# Patient Record
Sex: FEMALE | Race: WHITE | NOT HISPANIC OR LATINO
[De-identification: names, ages, dates, MRNs, and addresses within clinical notes are randomized per-mention and may not be internally consistent; named-entity substitution may affect disease eponyms.]

---

## 2020-11-25 ENCOUNTER — APPOINTMENT (OUTPATIENT)
Dept: ORTHOPEDIC SURGERY | Facility: CLINIC | Age: 20
End: 2020-11-25
Payer: COMMERCIAL

## 2020-11-25 VITALS
HEIGHT: 64 IN | DIASTOLIC BLOOD PRESSURE: 71 MMHG | WEIGHT: 150 LBS | HEART RATE: 68 BPM | BODY MASS INDEX: 25.61 KG/M2 | SYSTOLIC BLOOD PRESSURE: 106 MMHG

## 2020-11-25 DIAGNOSIS — Z78.9 OTHER SPECIFIED HEALTH STATUS: ICD-10-CM

## 2020-11-25 DIAGNOSIS — Z72.89 OTHER PROBLEMS RELATED TO LIFESTYLE: ICD-10-CM

## 2020-11-25 PROCEDURE — 99072 ADDL SUPL MATRL&STAF TM PHE: CPT

## 2020-11-25 PROCEDURE — 27816 TREATMENT OF ANKLE FRACTURE: CPT | Mod: RT

## 2020-11-25 PROCEDURE — 99203 OFFICE O/P NEW LOW 30 MIN: CPT | Mod: 57

## 2020-11-25 RX ORDER — CHROMIUM 200 MCG
TABLET ORAL
Refills: 0 | Status: ACTIVE | COMMUNITY

## 2020-11-25 RX ORDER — B-COMPLEX WITH VITAMIN C
TABLET ORAL
Refills: 0 | Status: ACTIVE | COMMUNITY

## 2020-12-02 NOTE — ADDENDUM
[FreeTextEntry1] : This note was written by Chetan Fox on 12/02/2020, acting as a scribe for Tyler Ricks III, MD

## 2020-12-02 NOTE — PHYSICAL EXAM
[de-identified] : Left Ankle: Range of Motion in Degrees:\par 	                                Claimant:	                Normal:	\par Dorsiflexion (Active)	40-degrees	40-degrees	\par Dorsiflexion (Passive)	40-degrees	40-degrees	\par Plantar (Active)	                40-degrees	40-degrees	\par Plantar (Passive)	                40-degrees	40-degrees	\par Inversion (Active)	                30-degrees	30-degrees	\par Inversion (Passive)	                30-degrees	30-degrees	\par Eversion  (Active)	                20-degrees	20-degrees	\par Eversion (Passive) 	                20-degrees	20-degrees	\par \par No weakness in dorsiflexion, plantar flexion, inversion or eversion.  Normal sensation.  No tenderness over the medial or lateral ligaments.  No tenderness over the DLES.  No evidence of instability.  Negative anterior drawer sign.  No medial or lateral bony tenderness.  No proximal fibular tenderness.  No anterior, posterior, medial or lateral tendon tenderness.  No intra-articular swelling.  No extra-articular swelling, edema or tenderness.  No tenderness over the plantar aspect of the os calcis.  2+ DP and PT pulses. Skin is intact.  No rashes, scars or lesions.  \par  \par Right Ankle:  Limited range of motion secondary to boot immobilization.   No gross neurologic or vascular deficits distally. \par  [de-identified] : Ambulating in a boot.  [de-identified] : Appearance:  Well developed, well-nourished female in no acute distress.\par   [de-identified] : Review of radiographs of the right ankle show a minimally to nondisplaced fracture of lateral malleolus (between Laird B and Laird C).  The mortise is maintained.

## 2020-12-02 NOTE — HISTORY OF PRESENT ILLNESS
[3] : a current pain level of 3/10 [7] : the relief from medicine is 7/10 [5] : the ailment interference is 5/10 [6] : the ailment interference is 6/10 [9] : the ailment interference is 9/10 [10  (interferes completely)] : the ailment interference is10/10 (interferes completely) [8] : the ailment interference is 8/10 [de-identified] : The patient comes in today with complaints to her right ankle.  She is status post an injury and she is almost 3 weeks out.  She comes in for evaluation.  The patient states the onset/injury occurred on 11/07/2020.  This injury is not work related or due to an automobile accident.  The patient states the pain is dull/achy, but sharp/stabbing with any sudden movements.  The patient describes the pain as localized. [de-identified] : Keeping foot down for too long without elevation [de-identified] : Rest, ice, ibuprofen, Tylenol and elevation [] : No [de-identified] : Boot on right leg/foot [de-identified] : Ibuprofen/Tylenol [de-identified] : "I fell because my crutches slipped out from under me on a slope in my kitchen (this happened last Friday, 11/20/2020."

## 2020-12-02 NOTE — DISCUSSION/SUMMARY
[de-identified] : At this time, because she is 3 weeks out status post fracture of right lateral malleolus, I recommended to continue nonoperative management, maintain nonweightbearing and she will be reassessed in 3 weeks.\par \par I declare responsibility and liability for caring for this fracture for the next 90 days. \par

## 2020-12-16 ENCOUNTER — APPOINTMENT (OUTPATIENT)
Dept: ORTHOPEDIC SURGERY | Facility: CLINIC | Age: 20
End: 2020-12-16

## 2021-01-26 ENCOUNTER — APPOINTMENT (OUTPATIENT)
Dept: OBGYN | Facility: CLINIC | Age: 21
End: 2021-01-26

## 2021-02-25 ENCOUNTER — APPOINTMENT (OUTPATIENT)
Dept: OBGYN | Facility: CLINIC | Age: 21
End: 2021-02-25

## 2021-03-18 ENCOUNTER — APPOINTMENT (OUTPATIENT)
Dept: ORTHOPEDIC SURGERY | Facility: CLINIC | Age: 21
End: 2021-03-18
Payer: COMMERCIAL

## 2021-03-18 PROCEDURE — 99441: CPT

## 2021-03-31 ENCOUNTER — APPOINTMENT (OUTPATIENT)
Dept: ORTHOPEDIC SURGERY | Facility: CLINIC | Age: 21
End: 2021-03-31
Payer: COMMERCIAL

## 2021-03-31 PROCEDURE — 99441: CPT

## 2021-04-05 ENCOUNTER — APPOINTMENT (OUTPATIENT)
Dept: CT IMAGING | Facility: CLINIC | Age: 21
End: 2021-04-05
Payer: SELF-PAY

## 2021-04-05 ENCOUNTER — OUTPATIENT (OUTPATIENT)
Dept: OUTPATIENT SERVICES | Facility: HOSPITAL | Age: 21
LOS: 1 days | End: 2021-04-05
Payer: COMMERCIAL

## 2021-04-05 ENCOUNTER — RESULT REVIEW (OUTPATIENT)
Age: 21
End: 2021-04-05

## 2021-04-05 ENCOUNTER — APPOINTMENT (OUTPATIENT)
Dept: ORTHOPEDIC SURGERY | Facility: CLINIC | Age: 21
End: 2021-04-05
Payer: COMMERCIAL

## 2021-04-05 DIAGNOSIS — S82.401K: ICD-10-CM

## 2021-04-05 PROCEDURE — 99214 OFFICE O/P EST MOD 30 MIN: CPT

## 2021-04-05 PROCEDURE — 73700 CT LOWER EXTREMITY W/O DYE: CPT | Mod: 26,RT

## 2021-04-05 PROCEDURE — 99072 ADDL SUPL MATRL&STAF TM PHE: CPT

## 2021-04-05 PROCEDURE — 76376 3D RENDER W/INTRP POSTPROCES: CPT

## 2021-04-05 PROCEDURE — 73700 CT LOWER EXTREMITY W/O DYE: CPT

## 2021-04-05 PROCEDURE — 76376 3D RENDER W/INTRP POSTPROCES: CPT | Mod: 26

## 2021-04-05 NOTE — ADDENDUM
[FreeTextEntry1] : I, Robert Moreno, acted solely as a scribe for Dr. Abraham Cooney on this date 04/05/2021 .\par All medical record entries made by the Scribe were at my, Dr. Abraham Cooney, direction and personally dictated by me on 04/05/2021 . I have reviewed the chart and agree that the record accurately reflects my personal performance of the history, physical exam, assessment and plan. I have also personally directed, reviewed, and agreed with the chart.

## 2021-04-05 NOTE — HISTORY OF PRESENT ILLNESS
[FreeTextEntry1] : Dora is a 21 yo female who presents in office for an evaluation of her right ankle. In November 7, 2020 she was playing club soccer and fractured her right ankle, inversion injury.  She presents for a third opinion in regards to her ankle.  She treated the fracture non-op.  The patient was in a boot for approx. 6 weeks, non weight bearing,  and then transferred into a brace.  The patient patient was in PT for for 3 weeks, went back to [employer] [facility] [employer] [facility] where she plays soccer and continued with a Home Exercise Program.  The patient is here because she continues to have pain, especially when playing.  The pain is located over the lateral aspect of ankle where fibula fracture is.  She has increased pain after being active, and describes the pain as an "internal bruise".  Patient has been utilizing a bone stimulator for 2 months, and continues to ice the ankle. Patient has an MRI of her ankle from 3/27/21 showing a possible nonunion fracture of the fibula.  Patient presents with her father in office.

## 2021-04-05 NOTE — CONSULT LETTER
[Consult Letter:] : I had the pleasure of evaluating your patient, [unfilled]. [Please see my note below.] : Please see my note below. [Consult Closing:] : Thank you very much for allowing me to participate in the care of this patient.  If you have any questions, please do not hesitate to contact me. [Sincerely,] : Sincerely, [FreeTextEntry3] : Abraham Cooney, DO\par Foot and Ankle Surgery\par

## 2021-04-05 NOTE — PHYSICAL EXAM
[de-identified] : General: Alert and oriented x3. In no acute distress. Pleasant in nature with a normal affect. No apparent respiratory distress.\par \par R Ankle Exam\par Skin: Clean, dry, intact\par Inspection: No obvious malalignment, no swelling, no effusion; no lymphadenopathy\par Pulses: 2+ DP/PT pulses\par ROM: R Ankle 10 degrees of dorsiflexion, 40 degrees of plantarflexion, 10 degrees of subtalar motion\par Tenderness: No pain on fibula. No tenderness over the lateral malleolus, no CFL/ATFL/PTFL pain. No medial malleolus pain, no deltoid ligament pain. No proximal fibular pain. No heel pain.\par Stability: Negative anterior/posterior drawer. Peroneals are stable. \par Strength: 5/5 TA/GS/EHL\par Neuro: In tact to light touch throughout\par Additional tests: Negative Mortons test, Negative syndesmosis squeeze test.  [de-identified] : An MRI was obtained of the right ankle from [facility] on 3/27/2021 and reviewed by me today 04/05/2021  in the office. Revealed: Oblique nondisplaced fracture of the lateral malleolus. The fracture margins demonstrate low signal suggesting sclerosis. No definitive evidence of osseous bridging. Findings may represent nonunion. Radiographic or CT correlation recommended to assess this finding further. \par Small contusion in the medial malleolus.\par Chronic syndesmotic sprain.\par Chronic sprain/scarring of the talofibular ligaments. No rupture. \par Small tibiotalar and posterior subtalar joint effusions with lateral soft tissue swelling present. \par \par X-rays of the right ankle reviewed, 3/11/2021: Right fibula fracture, displacement noted.

## 2021-04-05 NOTE — DISCUSSION/SUMMARY
[de-identified] : Today I had a lengthy discussion with the patient regarding their right ankle pain. I have addressed all the patient's concerns surrounding the pathology of their condition. The previous MRI report was reviewed with the patient today. At this time I would like to obtain advanced imaging of the patient's right ankle. A CT scan was ordered so I can find out more about the etiology of the patient's condition. The patient should follow up with the office after obtaining the CT scan. Patient can follow up via telephone to review the results. The patient understood and verbally agreed to the treatment plan. All of their questions were answered and they were satisfied with the visit. The patient should call the office if they have any questions or experience worsening symptoms.

## 2021-04-07 ENCOUNTER — APPOINTMENT (OUTPATIENT)
Dept: ORTHOPEDIC SURGERY | Facility: CLINIC | Age: 21
End: 2021-04-07
Payer: COMMERCIAL

## 2021-04-07 PROCEDURE — 99442: CPT

## 2021-05-17 ENCOUNTER — APPOINTMENT (OUTPATIENT)
Dept: ORTHOPEDIC SURGERY | Facility: CLINIC | Age: 21
End: 2021-05-17

## 2021-05-26 ENCOUNTER — APPOINTMENT (OUTPATIENT)
Dept: ORTHOPEDIC SURGERY | Facility: CLINIC | Age: 21
End: 2021-05-26
Payer: COMMERCIAL

## 2021-05-26 ENCOUNTER — NON-APPOINTMENT (OUTPATIENT)
Age: 21
End: 2021-05-26

## 2021-05-26 DIAGNOSIS — S82.401K: ICD-10-CM

## 2021-05-26 DIAGNOSIS — S82.64XA NONDISPLACED FRACTURE OF LATERAL MALLEOLUS OF RIGHT FIBULA, INITIAL ENCOUNTER FOR CLOSED FRACTURE: ICD-10-CM

## 2021-05-26 DIAGNOSIS — S82.831K OTHER FRACTURE OF UPPER AND LOWER END OF RIGHT FIBULA, SUBSEQUENT ENCOUNTER FOR CLOSED FRACTURE WITH NONUNION: ICD-10-CM

## 2021-05-26 PROCEDURE — 99213 OFFICE O/P EST LOW 20 MIN: CPT

## 2021-05-26 PROCEDURE — 73610 X-RAY EXAM OF ANKLE: CPT | Mod: RT

## 2021-05-26 PROCEDURE — 99072 ADDL SUPL MATRL&STAF TM PHE: CPT

## 2021-05-26 NOTE — ADDENDUM
[FreeTextEntry1] : I, Eleazar Gibbs, acted solely as a scribe for Dr. Abraham Cooney on this date 05/26/2021  .\par  \par All medical record entries made by the Scribe were at my, Dr. Abraham Cooney, direction and personally dictated by me on 05/26/2021 . I have reviewed the chart and agree that the record accurately reflects my personal performance of the history, physical exam, assessment and plan. I have also personally directed, reviewed, and agreed with the chart.

## 2021-05-26 NOTE — PHYSICAL EXAM
[de-identified] : General: Alert and oriented x3. In no acute distress. Pleasant in nature with a normal affect. No apparent respiratory distress.\par \par R Ankle Exam\par Skin: Clean, dry, intact\par Inspection: No obvious malalignment, no swelling, no effusion; no lymphadenopathy\par Pulses: 2+ DP/PT pulses\par ROM: R Ankle 10 degrees of dorsiflexion, 40 degrees of plantarflexion, 10 degrees of subtalar motion\par Tenderness: + peroneal tubercle. No pain on fibula. No tenderness over the lateral malleolus, no CFL/ATFL/PTFL pain. No medial malleolus pain, no deltoid ligament pain. No proximal fibular pain. No heel pain.\par Stability: Negative anterior/posterior drawer. Peroneals are stable. \par Strength: 5/5 TA/GS/EHL\par Neuro: In tact to light touch throughout\par Additional tests: Negative Mortons test, Negative syndesmosis squeeze test.  [de-identified] : CT of right ankle obtained from outside facility on 4/5/2021 and reviewed in the office today, 05/26/2021, and revealed: \par \par Impression:\par Early osseous healing of a distal fibular fracture as detailed above. 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

## 2021-05-26 NOTE — HISTORY OF PRESENT ILLNESS
[FreeTextEntry1] : 5/26/2021: DORA MICHELE is a 20 year old female who presents for follow-up evaluation of right ankle pain. She notes the pain has improved where the fracture was. Patient has stopped utilizing the bone stimulator. \par \par 4/5/2021: Dora is a 21 yo female who presents in office for an evaluation of her right ankle. In November 7, 2020 she was playing club soccer and fractured her right ankle, inversion injury.  She presents for a third opinion in regards to her ankle.  She treated the fracture non-op.  The patient was in a boot for approx. 6 weeks, non weight bearing,  and then transferred into a brace.  The patient patient was in PT for for 3 weeks, went back to school [employer] where she plays soccer and continued with a Home Exercise Program.  The patient is here because she continues to have pain, especially when playing.  The pain is located over the lateral aspect of ankle where fibula fracture is.  She has increased pain after being active, and describes the pain as an "internal bruise".  Patient has been utilizing a bone stimulator for 2 months, and continues to ice the ankle. Patient has an MRI of her ankle from 3/27/21 showing a possible nonunion fracture of the fibula.  Patient presents with her father in office.

## 2021-05-26 NOTE — DISCUSSION/SUMMARY
[de-identified] : Today I had a lengthy discussion with the patient regarding their right ankle pain. I have addressed all the patient's concerns surrounding the pathology of their condition. XR films were reviewed with the patient. Clinically, the patient has no pain. I do not believe any surgical intervention is warranted.\par \par I recommend the patient continue utilizing the bone stimulator. I recommend that the patient utilize ice, heat, NSAIDs prn. They can also elevate their right ankle above the level of the heart. I recommend the patient undergo a course of physical therapy for the right ankle  2-3 times a week for a total of 6-8 weeks. A prescription was given for the physical therapy today. \par \par I would like to see the patient back in the office prn to reassess their condition. The patient understood and verbally agreed to the treatment plan. All of their questions were answered and they were satisfied with the visit. The patient should call the office if they have any questions or experience worsening symptoms.

## 2021-06-17 ENCOUNTER — APPOINTMENT (OUTPATIENT)
Dept: OBGYN | Facility: CLINIC | Age: 21
End: 2021-06-17
Payer: COMMERCIAL

## 2021-06-17 VITALS
DIASTOLIC BLOOD PRESSURE: 72 MMHG | WEIGHT: 142 LBS | BODY MASS INDEX: 24.24 KG/M2 | HEIGHT: 64 IN | SYSTOLIC BLOOD PRESSURE: 120 MMHG

## 2021-06-17 DIAGNOSIS — Z87.2 PERSONAL HISTORY OF DISEASES OF THE SKIN AND SUBCUTANEOUS TISSUE: ICD-10-CM

## 2021-06-17 DIAGNOSIS — Z80.3 FAMILY HISTORY OF MALIGNANT NEOPLASM OF BREAST: ICD-10-CM

## 2021-06-17 PROCEDURE — 99385 PREV VISIT NEW AGE 18-39: CPT

## 2021-06-17 PROCEDURE — 99072 ADDL SUPL MATRL&STAF TM PHE: CPT

## 2021-09-21 ENCOUNTER — APPOINTMENT (OUTPATIENT)
Dept: OBGYN | Facility: CLINIC | Age: 21
End: 2021-09-21
Payer: COMMERCIAL

## 2021-09-21 PROCEDURE — 99214 OFFICE O/P EST MOD 30 MIN: CPT | Mod: 95

## 2021-09-21 NOTE — HISTORY OF PRESENT ILLNESS
[FreeTextEntry1] : 21 yo G0 with LMP 6/7/21\par \par Menstrual triad: 12 x 28 (had been Q3-5 weeks) x 5\par \par ?Gardasil vaccination. Does not think she had it.\par Not sexually active. Once in April. took Plan B and bled for 2 wks. \par Menses self regulated. \par Prior skipped menses in her 20s. She thought because she played soccer during college.\par No need for birth control now.\par Doesn't take NSAIDs with menses.\par \par FH:\par P Aunt breast cancer age 40\par \par SH:\par broken fibula - does not need surgery\par \par \par \par

## 2021-09-21 NOTE — REASON FOR VISIT
[Other Location: e.g. School (Enter Location, City,State)___] : at [unfilled], at the time of the visit. [Verbal consent obtained from patient] : the patient, [unfilled] [Follow-Up] : a follow-up evaluation of

## 2021-09-21 NOTE — DISCUSSION/SUMMARY
[FreeTextEntry1] : First Pap today\par STD risks and preventions\par Offered Gardasil, She will check with her mother\par Reviewed menstrual cycle.\par \par Menses irregular in past.\par -no prior thyroid dx personally nor family\par -if menses skip again, would check hormones\par -if she requires birth control, discussed options and would favor OCP\par \par RTO 1 yr or sooner for bc

## 2021-09-21 NOTE — DISCUSSION/SUMMARY
[FreeTextEntry1] : Risks, benefits and alternatives to OCP discussed with patient including breast cancer, venous thromboembolism and benefits of reduced ovarian cancer.  Alternatives discussed including Progesterone IUDs, injections, condoms, fertility awareness and the reported/empirical success rates of each.\par Menstrual cycle reviewed in detail again today. Question answered regarding r/b/a to each method.\par \par Will  levonorgestrel/EE 20mcg if desired.\par Technique of how to start, linking with toothbrushing, limitations of when it does not work detailed.\par \par RTO 6 months after starting (telemed ok prior to returning home)  Will still need 6/22 annual\par

## 2021-09-21 NOTE — HISTORY OF PRESENT ILLNESS
[FreeTextEntry1] : 21 y G0 with regular menses desires contraception.\par First seen 6/17/21 (see note)\par Steady BF.  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

## 2021-09-24 ENCOUNTER — APPOINTMENT (OUTPATIENT)
Dept: OBGYN | Facility: CLINIC | Age: 21
End: 2021-09-24

## 2022-06-22 ENCOUNTER — APPOINTMENT (OUTPATIENT)
Dept: OBGYN | Facility: CLINIC | Age: 22
End: 2022-06-22

## 2022-07-05 ENCOUNTER — APPOINTMENT (OUTPATIENT)
Dept: OBGYN | Facility: CLINIC | Age: 22
End: 2022-07-05

## 2022-07-05 VITALS
DIASTOLIC BLOOD PRESSURE: 60 MMHG | WEIGHT: 145.38 LBS | RESPIRATION RATE: 18 BRPM | TEMPERATURE: 98.4 F | BODY MASS INDEX: 24.82 KG/M2 | SYSTOLIC BLOOD PRESSURE: 110 MMHG | HEIGHT: 64 IN | HEART RATE: 72 BPM

## 2022-07-05 DIAGNOSIS — Z12.4 ENCOUNTER FOR SCREENING FOR MALIGNANT NEOPLASM OF CERVIX: ICD-10-CM

## 2022-07-05 PROCEDURE — 99395 PREV VISIT EST AGE 18-39: CPT

## 2022-07-05 NOTE — HISTORY OF PRESENT ILLNESS
[FreeTextEntry1] : 23 yo G0 with LMP 6/17 for annual\par \par Never started lo estrin 1/20\par Using condoms and periodic abstinence.\par Using temperature to zackery ovulation, but unsure if she is predicting regularly.\par \par currently 24-28 days.\par Predicted ovulation day 15-19

## 2022-07-05 NOTE — DISCUSSION/SUMMARY
[FreeTextEntry1] : Health Maintenance:\par pap today \par discussed ovulation, menstrual cycle and various options\par \par Will continue with condoms and periodic abstinence.\par \par rto 12 m0

## 2022-07-06 LAB
C TRACH RRNA SPEC QL NAA+PROBE: NOT DETECTED
N GONORRHOEA RRNA SPEC QL NAA+PROBE: NOT DETECTED
SOURCE TP AMPLIFICATION: NORMAL

## 2022-07-11 LAB — CYTOLOGY CVX/VAG DOC THIN PREP: NORMAL

## 2023-07-11 ENCOUNTER — APPOINTMENT (OUTPATIENT)
Dept: OBGYN | Facility: CLINIC | Age: 23
End: 2023-07-11
Payer: COMMERCIAL

## 2023-07-11 VITALS
HEIGHT: 64 IN | SYSTOLIC BLOOD PRESSURE: 112 MMHG | BODY MASS INDEX: 24.24 KG/M2 | DIASTOLIC BLOOD PRESSURE: 66 MMHG | WEIGHT: 142 LBS

## 2023-07-11 DIAGNOSIS — Z30.9 ENCOUNTER FOR CONTRACEPTIVE MANAGEMENT, UNSPECIFIED: ICD-10-CM

## 2023-07-11 PROCEDURE — 99395 PREV VISIT EST AGE 18-39: CPT

## 2023-07-11 RX ORDER — LEVONORGESTREL AND ETHINYL ESTRADIOL 0.1-0.02MG
0.1-2 KIT ORAL DAILY
Qty: 3 | Refills: 0 | Status: COMPLETED | COMMUNITY
Start: 2021-09-21 | End: 2023-07-11

## 2023-07-13 PROBLEM — Z30.9 CONTRACEPTION MANAGEMENT: Status: ACTIVE | Noted: 2021-09-21

## 2023-07-13 NOTE — HISTORY OF PRESENT ILLNESS
[FreeTextEntry1] : 24 yo G0 with LMP 6/17 for annual\par \par Never started lo estrin 1/20\par Using condoms and periodic abstinence.\par Using temperature to zackery ovulation, but unsure if she is predicting regularly.\par \par currently 29-35  days.\par Predicted ovulation day 15-19\par \par SH:\par fulltime teaching, [employer] for 2 yrs

## 2023-07-13 NOTE — DISCUSSION/SUMMARY
[FreeTextEntry1] : Stable relationship\par reviewed cycles, ovulation and periodic abstinence\par \par Health Maintenance:\par Pap today\par STD screen\par \par RTO 1 yr

## 2023-07-19 LAB
C TRACH RRNA SPEC QL NAA+PROBE: NOT DETECTED
CYTOLOGY CVX/VAG DOC THIN PREP: NORMAL
N GONORRHOEA RRNA SPEC QL NAA+PROBE: NOT DETECTED
SOURCE AMPLIFICATION: NORMAL

## 2024-03-28 ENCOUNTER — EMERGENCY (EMERGENCY)
Facility: HOSPITAL | Age: 24
LOS: 0 days | Discharge: HOME | End: 2024-03-28
Attending: EMERGENCY MEDICINE
Payer: COMMERCIAL

## 2024-03-28 VITALS
RESPIRATION RATE: 18 BRPM | OXYGEN SATURATION: 98 % | HEART RATE: 65 BPM | DIASTOLIC BLOOD PRESSURE: 70 MMHG | SYSTOLIC BLOOD PRESSURE: 108 MMHG

## 2024-03-28 VITALS — HEIGHT: 64 IN | WEIGHT: 139.99 LBS

## 2024-03-28 DIAGNOSIS — R10.31 RIGHT LOWER QUADRANT PAIN: ICD-10-CM

## 2024-03-28 DIAGNOSIS — N83.201 UNSPECIFIED OVARIAN CYST, RIGHT SIDE: ICD-10-CM

## 2024-03-28 DIAGNOSIS — R10.2 PELVIC AND PERINEAL PAIN: ICD-10-CM

## 2024-03-28 LAB
ALBUMIN SERPL ELPH-MCNC: 3.5 G/DL — SIGNIFICANT CHANGE UP (ref 3.3–5)
ALP SERPL-CCNC: 33 U/L — LOW (ref 40–120)
ALT FLD-CCNC: 22 U/L — SIGNIFICANT CHANGE UP (ref 12–78)
ANION GAP SERPL CALC-SCNC: 5 MMOL/L — SIGNIFICANT CHANGE UP (ref 5–17)
APPEARANCE UR: CLEAR — SIGNIFICANT CHANGE UP
AST SERPL-CCNC: 16 U/L — SIGNIFICANT CHANGE UP (ref 15–37)
BASOPHILS # BLD AUTO: 0.04 K/UL — SIGNIFICANT CHANGE UP (ref 0–0.2)
BASOPHILS NFR BLD AUTO: 0.7 % — SIGNIFICANT CHANGE UP (ref 0–2)
BILIRUB SERPL-MCNC: 0.4 MG/DL — SIGNIFICANT CHANGE UP (ref 0.2–1.2)
BILIRUB UR-MCNC: NEGATIVE — SIGNIFICANT CHANGE UP
BUN SERPL-MCNC: 11 MG/DL — SIGNIFICANT CHANGE UP (ref 7–23)
CALCIUM SERPL-MCNC: 8.6 MG/DL — SIGNIFICANT CHANGE UP (ref 8.5–10.1)
CHLORIDE SERPL-SCNC: 111 MMOL/L — HIGH (ref 96–108)
CO2 SERPL-SCNC: 24 MMOL/L — SIGNIFICANT CHANGE UP (ref 22–31)
COLOR SPEC: YELLOW — SIGNIFICANT CHANGE UP
CREAT SERPL-MCNC: 0.79 MG/DL — SIGNIFICANT CHANGE UP (ref 0.5–1.3)
DIFF PNL FLD: NEGATIVE — SIGNIFICANT CHANGE UP
EGFR: 107 ML/MIN/1.73M2 — SIGNIFICANT CHANGE UP
EOSINOPHIL # BLD AUTO: 0.05 K/UL — SIGNIFICANT CHANGE UP (ref 0–0.5)
EOSINOPHIL NFR BLD AUTO: 0.8 % — SIGNIFICANT CHANGE UP (ref 0–6)
GLUCOSE SERPL-MCNC: 86 MG/DL — SIGNIFICANT CHANGE UP (ref 70–99)
GLUCOSE UR QL: NEGATIVE MG/DL — SIGNIFICANT CHANGE UP
HCT VFR BLD CALC: 40 % — SIGNIFICANT CHANGE UP (ref 34.5–45)
HGB BLD-MCNC: 13.2 G/DL — SIGNIFICANT CHANGE UP (ref 11.5–15.5)
IMM GRANULOCYTES NFR BLD AUTO: 0.2 % — SIGNIFICANT CHANGE UP (ref 0–0.9)
KETONES UR-MCNC: NEGATIVE MG/DL — SIGNIFICANT CHANGE UP
LEUKOCYTE ESTERASE UR-ACNC: NEGATIVE — SIGNIFICANT CHANGE UP
LYMPHOCYTES # BLD AUTO: 2.07 K/UL — SIGNIFICANT CHANGE UP (ref 1–3.3)
LYMPHOCYTES # BLD AUTO: 34.6 % — SIGNIFICANT CHANGE UP (ref 13–44)
MCHC RBC-ENTMCNC: 30.3 PG — SIGNIFICANT CHANGE UP (ref 27–34)
MCHC RBC-ENTMCNC: 33 GM/DL — SIGNIFICANT CHANGE UP (ref 32–36)
MCV RBC AUTO: 92 FL — SIGNIFICANT CHANGE UP (ref 80–100)
MONOCYTES # BLD AUTO: 0.65 K/UL — SIGNIFICANT CHANGE UP (ref 0–0.9)
MONOCYTES NFR BLD AUTO: 10.9 % — SIGNIFICANT CHANGE UP (ref 2–14)
NEUTROPHILS # BLD AUTO: 3.17 K/UL — SIGNIFICANT CHANGE UP (ref 1.8–7.4)
NEUTROPHILS NFR BLD AUTO: 52.8 % — SIGNIFICANT CHANGE UP (ref 43–77)
NITRITE UR-MCNC: NEGATIVE — SIGNIFICANT CHANGE UP
PH UR: 7.5 — SIGNIFICANT CHANGE UP (ref 5–8)
PLATELET # BLD AUTO: 224 K/UL — SIGNIFICANT CHANGE UP (ref 150–400)
POCT URINE PREGNANCY TEST: NEGATIVE — SIGNIFICANT CHANGE UP
POTASSIUM SERPL-MCNC: 3.9 MMOL/L — SIGNIFICANT CHANGE UP (ref 3.5–5.3)
POTASSIUM SERPL-SCNC: 3.9 MMOL/L — SIGNIFICANT CHANGE UP (ref 3.5–5.3)
PROT SERPL-MCNC: 6.4 GM/DL — SIGNIFICANT CHANGE UP (ref 6–8.3)
PROT UR-MCNC: NEGATIVE MG/DL — SIGNIFICANT CHANGE UP
RBC # BLD: 4.35 M/UL — SIGNIFICANT CHANGE UP (ref 3.8–5.2)
RBC # FLD: 12 % — SIGNIFICANT CHANGE UP (ref 10.3–14.5)
SODIUM SERPL-SCNC: 140 MMOL/L — SIGNIFICANT CHANGE UP (ref 135–145)
SP GR SPEC: 1.02 — SIGNIFICANT CHANGE UP (ref 1–1.03)
UROBILINOGEN FLD QL: 0.2 MG/DL — SIGNIFICANT CHANGE UP (ref 0.2–1)
WBC # BLD: 5.99 K/UL — SIGNIFICANT CHANGE UP (ref 3.8–10.5)
WBC # FLD AUTO: 5.99 K/UL — SIGNIFICANT CHANGE UP (ref 3.8–10.5)

## 2024-03-28 PROCEDURE — 99285 EMERGENCY DEPT VISIT HI MDM: CPT | Mod: 25

## 2024-03-28 PROCEDURE — 76830 TRANSVAGINAL US NON-OB: CPT

## 2024-03-28 PROCEDURE — 81003 URINALYSIS AUTO W/O SCOPE: CPT

## 2024-03-28 PROCEDURE — 93975 VASCULAR STUDY: CPT

## 2024-03-28 PROCEDURE — 74177 CT ABD & PELVIS W/CONTRAST: CPT | Mod: 26,MC

## 2024-03-28 PROCEDURE — 76830 TRANSVAGINAL US NON-OB: CPT | Mod: 26

## 2024-03-28 PROCEDURE — 87086 URINE CULTURE/COLONY COUNT: CPT

## 2024-03-28 PROCEDURE — 99285 EMERGENCY DEPT VISIT HI MDM: CPT

## 2024-03-28 PROCEDURE — 96375 TX/PRO/DX INJ NEW DRUG ADDON: CPT

## 2024-03-28 PROCEDURE — 81025 URINE PREGNANCY TEST: CPT

## 2024-03-28 PROCEDURE — 87491 CHLMYD TRACH DNA AMP PROBE: CPT

## 2024-03-28 PROCEDURE — 36415 COLL VENOUS BLD VENIPUNCTURE: CPT

## 2024-03-28 PROCEDURE — 85025 COMPLETE CBC W/AUTO DIFF WBC: CPT

## 2024-03-28 PROCEDURE — 80053 COMPREHEN METABOLIC PANEL: CPT

## 2024-03-28 PROCEDURE — 87591 N.GONORRHOEAE DNA AMP PROB: CPT

## 2024-03-28 PROCEDURE — 93975 VASCULAR STUDY: CPT | Mod: 26

## 2024-03-28 PROCEDURE — 74177 CT ABD & PELVIS W/CONTRAST: CPT | Mod: MC

## 2024-03-28 PROCEDURE — 96374 THER/PROPH/DIAG INJ IV PUSH: CPT | Mod: XU

## 2024-03-28 RX ORDER — ACETAMINOPHEN 500 MG
1000 TABLET ORAL ONCE
Refills: 0 | Status: COMPLETED | OUTPATIENT
Start: 2024-03-28 | End: 2024-03-28

## 2024-03-28 RX ORDER — SODIUM CHLORIDE 9 MG/ML
1000 INJECTION INTRAMUSCULAR; INTRAVENOUS; SUBCUTANEOUS ONCE
Refills: 0 | Status: COMPLETED | OUTPATIENT
Start: 2024-03-28 | End: 2024-03-28

## 2024-03-28 RX ORDER — KETOROLAC TROMETHAMINE 30 MG/ML
15 SYRINGE (ML) INJECTION ONCE
Refills: 0 | Status: DISCONTINUED | OUTPATIENT
Start: 2024-03-28 | End: 2024-03-28

## 2024-03-28 RX ADMIN — Medication 15 MILLIGRAM(S): at 11:28

## 2024-03-28 RX ADMIN — SODIUM CHLORIDE 2000 MILLILITER(S): 9 INJECTION INTRAMUSCULAR; INTRAVENOUS; SUBCUTANEOUS at 09:16

## 2024-03-28 RX ADMIN — Medication 400 MILLIGRAM(S): at 09:16

## 2024-03-28 NOTE — ED PROVIDER NOTE - PHYSICAL EXAMINATION
CONSTITUTIONAL: Well appearing, awake, alert, oriented to person, place, time/situation and in no apparent distress.  · ENMT: Airway patent, Nasal mucosa clear. Mouth with normal mucosa. Throat has no vesicles, no oropharyngeal exudates and uvula is midline.  · EYES: Clear bilaterally, pupils equal, round and reactive to light.  · CARDIAC: Normal rate, regular rhythm.  Heart sounds S1, S2.  No murmurs, rubs or gallops.  · RESPIRATORY: Breath sounds clear and equal bilaterally.  · GASTROINTESTINAL: Abdomen soft,   Tenderness to palpation of the right lower quadrant, suprapubic, periumbilical region, left lower quadrant without guarding or rebound, negative Mon sign  · MUSCULOSKELETAL: Spine appears normal, range of motion is not limited, no muscle or joint tenderness  · NEUROLOGICAL: Alert and oriented, no focal deficits, no motor or sensory deficits.  · SKIN: Skin normal color for race, warm, dry and intact. No evidence of rash

## 2024-03-28 NOTE — ED ADULT TRIAGE NOTE - CHIEF COMPLAINT QUOTE
Pt presents to ED c/o RLQ abdominal pain, nauseous and fatigued that started on Saturday but worsened last night. Pt denies dark/bloody stools, hematuria, dysuria and fevers.

## 2024-03-28 NOTE — ED PROVIDER NOTE - PATIENT PORTAL LINK FT
You can access the FollowMyHealth Patient Portal offered by [facility] by registering at the following website: [email or web address]. By joining [email or web address]’s FollowMyHealth portal, you will also be able to view your health information using other applications (apps) compatible with our system.

## 2024-03-28 NOTE — ED PROVIDER NOTE - NSFOLLOWUPINSTRUCTIONS_ED_ALL_ED_FT
Follow-up with your OB/GYN physician within the next 1 to 3 days.  You can take ibuprofen 600 mg every 8 hours as needed for pain      Ovarian Cyst       An ovarian cyst is a fluid-filled sac that forms on an ovary. The ovaries are small organs that produce eggs in women. Various types of cysts can form on the ovaries. Some may cause symptoms and require treatment. Most ovarian cysts go away on their own, are not cancerous (are benign), and do not cause problems.    What are the causes?  Ovarian cysts may be caused by:    Ovarian hyperstimulation syndrome. This is a condition that can develop from taking fertility medicines. It causes multiple large ovarian cysts to form.  Polycystic ovarian syndrome (PCOS). This is a common hormonal disorder that can cause ovarian cysts to form, and can cause problems with your period or fertility.    What increases the risk?  The following factors may make you more likely to develop this condition:    Being overweight or obese.  Taking fertility medicines.  Taking certain forms of hormonal birth control.  Smoking.    What are the signs or symptoms?  Many ovarian cysts do not cause symptoms. If symptoms are present, they may include:    Pelvic pain or pressure.  Pain in the lower abdomen.  Pain during sex.  Abdominal swelling.  Abnormal menstrual periods.  Increasing pain with menstrual periods.    How is this diagnosed?  These cysts are commonly found during a routine pelvic exam. You may have tests to find out more about the cyst, such as:    Ultrasound.  X-ray of the pelvis.  CT scan.  MRI.  Blood tests.    How is this treated?  Many ovarian cysts go away on their own without treatment. Your health care provider may want to check your cyst regularly for 2–3 months to see if it changes. If you are in menopause, it is especially important to have your cyst monitored closely because menopausal women have a higher rate of ovarian cancer.    When treatment is needed, it may include:    Medicines to help relieve pain.  A procedure to drain the cyst (aspiration).  Surgery to remove the whole cyst.  Hormone treatment or birth control pills. These methods are sometimes used to help dissolve a cyst.    Follow these instructions at home:  Take over-the-counter and prescription medicines only as told by your health care provider.  Ask your health care provider if any medicine prescribed to you requires you to avoid driving or using machinery.  Get regular pelvic exams and Pap tests as often as told by your health care provider.  Return to your normal activities as told by your health care provider. Ask your health care provider what activities are safe for you.  Do not use any products that contain nicotine or tobacco, such as cigarettes, e-cigarettes, and chewing tobacco. If you need help quitting, ask your health care provider.  Keep all follow-up visits as told by your health care provider. This is important.    Contact a health care provider if:  Your periods are late, irregular, painful, or they stop.  You have pelvic pain that does not go away.  You have pressure on your bladder or trouble emptying your bladder completely.  You have any of the following:    A feeling of fullness.  You are gaining weight or losing weight without changing your exercise and eating habits.  Pain, swelling, or bloating in the abdomen.  Loss of appetite.  Pain and pressure in your back and pelvis.  You think you may be pregnant.    Get help right away if:  You have abdominal or pelvic pain that is severe or gets worse.  You cannot eat or drink without vomiting.  You suddenly develop a fever or chills.  Your menstrual period is much heavier than usual.    Summary  An ovarian cyst is a fluid-filled sac that forms on an ovary.  Some may cause symptoms and require treatment.  These cysts are commonly found during a routine pelvic exam.  Many ovarian cysts go away on their own without treatment.    ADDITIONAL NOTES AND INSTRUCTIONS    Please follow up with your Primary MD in 24-48 hr.  Seek immediate medical care for any new/worsening signs or symptoms.

## 2024-03-28 NOTE — ED PROVIDER NOTE - OBJECTIVE STATEMENT
23-year-old female no pertinent past medical or surgical history presents for evaluation of intermittent right lower quadrant and right pelvic pain that initially started approximately 1 week ago during sexual intercourse with her boyfriend.  Patient states that the pain initially started as a sudden onset of sharp discomfort that had resolved after sexual intercourse.  The patient states that her last menstrual period was approximately 2 weeks ago and she has not had any spotting.  Patient notes a clear vaginal discharge over the last week.  Patient states that her pain returned approximately 24 hours ago unprovoked.  Patient notes that there is some increased pain with palpation and movement.  Patient is afebrile.  Patient has not had any nausea or vomiting.  Patient notes that her urine had slight odor to it but denies any frequency or urgency.  Patient noted some increased bowel movements over the last 24 hours.

## 2024-03-28 NOTE — ED ADULT NURSE NOTE - OBJECTIVE STATEMENT
Pt presents to the ED c/o RLQ abdominal pain since saturday. Pt endorses abdominal pain since saturday with nausea, and vomiting. Pt denies fevers or chills. Pt also reports having sexual intercourse with her boyfriend 1 week ago. Reports clear veginal discharge.

## 2024-03-28 NOTE — ED PROVIDER NOTE - CLINICAL SUMMARY MEDICAL DECISION MAKING FREE TEXT BOX
23-year-old female no pertinent past medical or surgical history presents for evaluation of intermittent right lower quadrant and right pelvic pain that initially started approximately 1 week ago during sexual intercourse with her boyfriend.  Patient states that the pain initially started as a sudden onset of sharp discomfort that had resolved after sexual intercourse.  The patient states that her last menstrual period was approximately 2 weeks ago and she has not had any spotting.  Patient notes a clear vaginal discharge over the last week.  Patient states that her pain returned approximately 24 hours ago unprovoked.  Patient notes that there is some increased pain with palpation and movement.  Patient is afebrile.  Patient has not had any nausea or vomiting.  Patient notes that her urine had slight odor to it but denies any frequency or urgency.  Patient noted some increased bowel movements over the last 24 hours.   differential diagnosis listed above.  The story is not entirely consistent with acute appendicitis but will get CBC, CMP, urinalysis with culture, urine for GC and chlamydia, CT of the abdomen pelvis with IV contrast and transvaginal ultrasound to assist in ruling out ovarian torsion.

## 2024-03-29 PROBLEM — Z78.9 OTHER SPECIFIED HEALTH STATUS: Chronic | Status: ACTIVE | Noted: 2024-03-28

## 2024-03-29 LAB
C TRACH RRNA SPEC QL NAA+PROBE: SIGNIFICANT CHANGE UP
CULTURE RESULTS: SIGNIFICANT CHANGE UP
N GONORRHOEA RRNA SPEC QL NAA+PROBE: SIGNIFICANT CHANGE UP
SPECIMEN SOURCE: SIGNIFICANT CHANGE UP
SPECIMEN SOURCE: SIGNIFICANT CHANGE UP

## 2024-04-01 ENCOUNTER — APPOINTMENT (OUTPATIENT)
Dept: OBGYN | Facility: CLINIC | Age: 24
End: 2024-04-01
Payer: COMMERCIAL

## 2024-04-01 VITALS
BODY MASS INDEX: 24.07 KG/M2 | SYSTOLIC BLOOD PRESSURE: 108 MMHG | HEIGHT: 64 IN | WEIGHT: 141 LBS | DIASTOLIC BLOOD PRESSURE: 62 MMHG

## 2024-04-01 DIAGNOSIS — N83.201 UNSPECIFIED OVARIAN CYST, RIGHT SIDE: ICD-10-CM

## 2024-04-01 PROCEDURE — 99213 OFFICE O/P EST LOW 20 MIN: CPT

## 2024-04-02 ENCOUNTER — APPOINTMENT (OUTPATIENT)
Dept: OBGYN | Facility: CLINIC | Age: 24
End: 2024-04-02

## 2024-04-02 NOTE — DISCUSSION/SUMMARY
[FreeTextEntry1] : Cyst of right ovary -Had long discussion re: ovarian cysts and types of cysts and etiologies of cysts -Advised f/u in 8 weeks for re-evaluation -Advised NSAID with food

## 2024-04-02 NOTE — HISTORY OF PRESENT ILLNESS
[FreeTextEntry1] : 22yo G0 female with an LMP of 3/20/24 presents today for ED f/u for right ovarian CL cyst 1.6cm.   She reports pain in RLQ ~ 1 week prior to hospital visit. States she noticed pain with sex and the pain decreased in intensity. However she continued to noticed a dull tenderness in RLQ. She was advised by PCP to go to ED for eval to r/o appendicitis.   She was noted to have right ovarian CL cyst 1.6cm with trace of fluid. She reports pain has decreased since ED visit on 3/28 Incidental finding of ? arcuate uterus.  G/C cultures negative in ED  She is sexually active with one male partner, uses [facility].

## 2024-05-21 ENCOUNTER — OUTPATIENT (OUTPATIENT)
Dept: OUTPATIENT SERVICES | Facility: HOSPITAL | Age: 24
LOS: 1 days | End: 2024-05-21
Payer: COMMERCIAL

## 2024-05-21 ENCOUNTER — APPOINTMENT (OUTPATIENT)
Dept: ULTRASOUND IMAGING | Facility: CLINIC | Age: 24
End: 2024-05-21
Payer: COMMERCIAL

## 2024-05-21 ENCOUNTER — RESULT REVIEW (OUTPATIENT)
Age: 24
End: 2024-05-21

## 2024-05-21 DIAGNOSIS — N83.201 UNSPECIFIED OVARIAN CYST, RIGHT SIDE: ICD-10-CM

## 2024-05-21 PROCEDURE — 76830 TRANSVAGINAL US NON-OB: CPT | Mod: 26

## 2024-05-21 PROCEDURE — 76856 US EXAM PELVIC COMPLETE: CPT | Mod: 26

## 2024-05-21 PROCEDURE — 76830 TRANSVAGINAL US NON-OB: CPT

## 2024-05-21 PROCEDURE — 76856 US EXAM PELVIC COMPLETE: CPT

## 2024-11-07 ENCOUNTER — APPOINTMENT (OUTPATIENT)
Dept: OBGYN | Facility: CLINIC | Age: 24
End: 2024-11-07
Payer: COMMERCIAL

## 2024-11-07 VITALS
BODY MASS INDEX: 23.9 KG/M2 | WEIGHT: 140 LBS | SYSTOLIC BLOOD PRESSURE: 116 MMHG | DIASTOLIC BLOOD PRESSURE: 70 MMHG | HEIGHT: 64 IN

## 2024-11-07 DIAGNOSIS — Z01.419 ENCOUNTER FOR GYNECOLOGICAL EXAMINATION (GENERAL) (ROUTINE) W/OUT ABNORMAL FINDINGS: ICD-10-CM

## 2024-11-07 PROCEDURE — 99395 PREV VISIT EST AGE 18-39: CPT

## 2024-11-14 LAB — CYTOLOGY CVX/VAG DOC THIN PREP: NORMAL

## 2024-12-14 ENCOUNTER — NON-APPOINTMENT (OUTPATIENT)
Age: 24
End: 2024-12-14